# Patient Record
Sex: FEMALE | Race: WHITE | HISPANIC OR LATINO | URBAN - METROPOLITAN AREA
[De-identification: names, ages, dates, MRNs, and addresses within clinical notes are randomized per-mention and may not be internally consistent; named-entity substitution may affect disease eponyms.]

---

## 2022-03-23 ENCOUNTER — TRANSCRIBE ORDERS (OUTPATIENT)
Dept: ADMINISTRATIVE | Facility: HOSPITAL | Age: 42
End: 2022-03-23

## 2022-03-23 DIAGNOSIS — M51.36 DISC DEGENERATION, LUMBAR: Primary | ICD-10-CM

## 2025-03-07 ENCOUNTER — PROCEDURE VISIT (OUTPATIENT)
Dept: OTOLARYNGOLOGY | Facility: CLINIC | Age: 45
End: 2025-03-07
Payer: COMMERCIAL

## 2025-03-07 DIAGNOSIS — H93.13 TINNITUS OF BOTH EARS: ICD-10-CM

## 2025-03-07 DIAGNOSIS — G43.809 VESTIBULAR MIGRAINE: ICD-10-CM

## 2025-03-07 DIAGNOSIS — H92.03 OTALGIA OF BOTH EARS: Primary | ICD-10-CM

## 2025-03-07 PROCEDURE — 92567 TYMPANOMETRY: CPT | Performed by: AUDIOLOGIST

## 2025-03-07 PROCEDURE — 92557 COMPREHENSIVE HEARING TEST: CPT | Performed by: AUDIOLOGIST

## 2025-03-07 NOTE — PROGRESS NOTES
AUDIOMETRIC EVALUATION      Name:  Lizbeth Hugo  :  1980  Age:  44 y.o.  Date of Evaluation:  2025       History:  Mrs. Hugo is seen today for a hearing evaluation due to tinnitus.    Audiologic Information:  Concerns for Hearing: No  PETs: None  Other otologic surgical history: No  Aural Pressure/Fullness: Periodically  Otalgia: Denies  Otorrhea: No  Tinnitus: Yes each ear  Dizziness: Periodically  Noise Exposure: Yes  Family history of hearing loss: Yes  Head trauma requiring hospital stay: No  Chemotherapy: None  Other significant history: No    EVALUATION:    See audiogram    RESULTS:    Otoscopic Evaluation:  Right: Unremarkable  Left: Unremarkable    Tympanometry (226 Hz):  Right: Type A  Left: Type A    IMPRESSIONS:  Pure tone thresholds for the right ear shows hearing within normal limits.  Pure tone thresholds for the left ear shows hearing within normal limits.  Patient was counseled with regard to the findings.    RECOMMENDATIONS/PLAN:  Follow-up with primary care physician for possible vestibular migraine issues.  Discussed results and recommendations with patient. Questions were addressed and the patient was encouraged to contact our department should concerns arise.          Ramone Christopher M.S, [facility]  Licensed Audiologist